# Patient Record
Sex: MALE | Race: WHITE
[De-identification: names, ages, dates, MRNs, and addresses within clinical notes are randomized per-mention and may not be internally consistent; named-entity substitution may affect disease eponyms.]

---

## 2019-06-28 ENCOUNTER — HOSPITAL ENCOUNTER (EMERGENCY)
Dept: HOSPITAL 39 - ER | Age: 34
Discharge: HOME | End: 2019-06-28
Payer: COMMERCIAL

## 2019-06-28 VITALS — OXYGEN SATURATION: 97 %

## 2019-06-28 VITALS — SYSTOLIC BLOOD PRESSURE: 132 MMHG | DIASTOLIC BLOOD PRESSURE: 87 MMHG

## 2019-06-28 VITALS — TEMPERATURE: 98 F

## 2019-06-28 DIAGNOSIS — R04.2: Primary | ICD-10-CM

## 2019-06-28 DIAGNOSIS — Z87.891: ICD-10-CM

## 2019-06-28 PROCEDURE — 36415 COLL VENOUS BLD VENIPUNCTURE: CPT

## 2019-06-28 PROCEDURE — 80053 COMPREHEN METABOLIC PANEL: CPT

## 2019-06-28 PROCEDURE — 85730 THROMBOPLASTIN TIME PARTIAL: CPT

## 2019-06-28 PROCEDURE — 71045 X-RAY EXAM CHEST 1 VIEW: CPT

## 2019-06-28 PROCEDURE — 85025 COMPLETE CBC W/AUTO DIFF WBC: CPT

## 2019-06-28 PROCEDURE — 85610 PROTHROMBIN TIME: CPT

## 2019-06-28 NOTE — RAD
EXAM DESCRIPTION: Chest,1 View



CLINICAL HISTORY: 33 years Male, coughing/vomiting blood



COMPARISON: None.



TECHNIQUE: AP portable chest.



FINDINGS:



Heart size is normal with normal pulmonary vascularity. NG tube

courses through the esophagus to the stomach. Endotracheal tube

is not identified. No consolidating infiltrate.



No pulmonary mass or worrisome nodule.



No pneumothorax or pleural effusion.



Bones are unremarkable.



IMPRESSION:



NG tube tip in the stomach.



Clear chest.



Electronically signed by:  Bradley Ferrari MD  6/28/2019 1:32

PM CDT Workstation: [id number]

## 2019-06-28 NOTE — ED.PDOC
History of Present Illness





- General


Chief Complaint: General


Stated Complaint: vomitting since this AM and R flank pain


Time Seen by Provider: 19 10:29


Source: patient


Exam Limitations: no limitations





- History of Present Illness


Initial Comments: 





34 yo healthy male c/o "spitting up blood" in the shower this morning. He 

couldn't tell if it was draining from his throat, he was coughing it up or (less

likely) he was vomiting. Volume appears to have been less than 10-15 m. It made 

him gag. No previous episodes, recent illnesses or other symptoms. He drinks a 6

pack a day. No NSAIDs.


Timing/Duration: 4-6 hours


Severity: mild


Improving Factors: nothing


Worsening Factors: nothing


Associated Symptoms: denies symptoms


Allergies/Adverse Reactions: 


Allergies





Camphor [From Vicks Vaporub] Allergy (Verified 19 10:23)


   


Eucalyptus Oil [From Vicks Vaporub] Allergy (Verified 19 10:23)


   


Menthol [From Vicks Vaporub] Allergy (Verified 19 10:23)


   





Home Medications: 


Ambulatory Orders





NK  19 











Review of Systems





- Review of Systems


Constitutional: States: no symptoms reported


EENTM: States: see HPI


Respiratory: States: see HPI


Cardiology: States: no symptoms reported


Gastrointestinal/Abdominal: States: see HPI


Genitourinary: States: no symptoms reported


Musculoskeletal: States: no symptoms reported


Skin: States: no symptoms reported


Neurological: States: anxiety


Endocrine: States: no symptoms reported


Hematologic/Lymphatic: States: no symptoms reported





Past Medical History (General)





- Patient Medical History


Hx Stroke: No


Hx of COPD: No


Hx Congestive Heart Failure: No


Hx Hypertension: No


Hx Diabetes: No


Hx Cancer: No


Surgical History: other





- Vaccination History


Hx Tetanus, Diphtheria Vaccination: No


Hx Influenza Vaccination: No


Hx Pneumococcal Vaccination: No





- Social History


Hx Tobacco Use: Yes


Hx Alcohol Use: Yes


Hx Substance Use: Yes


Hx Substance Use Treatment: No


Hx Depression: No





- Female History


Patient is a Female of Child Bearing Age (10 -59 yrs old): No


Patient Pregnant: No





Family Medical History





- Family History


  ** Father


Family History: Unknown


Living Status: 


Hx Family Cancer: Yes - Liver


Hx Family;Other: COPD





Physical Exam





- Physical Exam


General Appearance: Alert, Anxious, Comfortable, No apparent distress


Eye Exam: bilateral normal


Ears, Nose, Throat: normal ENT inspection


Neck: supple


Respiratory: lungs clear, normal breath sounds, no respiratory distress


Cardiovascular/Chest: regular rate, rhythm, no edema, no murmur


Gastrointestinal/Abdominal: non tender, soft, no organomegaly


Extremity: normal inspection


Neurologic: alert, normal mood/affect, oriented x 3


Skin Exam: normal color, warm/dry





Progress





- Progress


Progress: 





19 13:48


Asymptomatic. No bleeding episodes here. NG return without blood. Etiology of 

symptoms is unclear. However, I suspect it is not coming from his stomach but 

will empirically place him on Pepcid & have him go to the clinic for f/u next 

week. Red flag precautions given. Hemodynamically stable.





- Results/Orders


Results/Orders: 





Hgb 15


Plt 250


BUN 21


PT, PTT nml





- EKG/XRAY/CT


XRAY: chest - no acute process





Departure





- Departure


Clinical Impression: 


 Hemoptysis





Time of Disposition: 13:52


Disposition: Discharge to Home or Self Care


Condition: Good


Departure Forms:  ED Discharge - Pt. Copy, Patient Portal Self Enrollment


Referrals: 


Anu Rodriguez NP [Nurse Practitioner] - 19


Home Medications: 


Ambulatory Orders





NK  19 








Comments: 





Start taking over the counter Pepcid.